# Patient Record
Sex: MALE | Race: WHITE | NOT HISPANIC OR LATINO | URBAN - METROPOLITAN AREA
[De-identification: names, ages, dates, MRNs, and addresses within clinical notes are randomized per-mention and may not be internally consistent; named-entity substitution may affect disease eponyms.]

---

## 2019-03-11 ENCOUNTER — EMERGENCY (EMERGENCY)
Facility: HOSPITAL | Age: 69
LOS: 1 days | Discharge: HOME | End: 2019-03-11
Attending: EMERGENCY MEDICINE | Admitting: EMERGENCY MEDICINE
Payer: MEDICARE

## 2019-03-11 VITALS
SYSTOLIC BLOOD PRESSURE: 168 MMHG | HEART RATE: 112 BPM | OXYGEN SATURATION: 99 % | TEMPERATURE: 99 F | DIASTOLIC BLOOD PRESSURE: 81 MMHG | RESPIRATION RATE: 18 BRPM
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
## 2019-03-11 DIAGNOSIS — S00.83XA CONTUSION OF OTHER PART OF HEAD, INITIAL ENCOUNTER: ICD-10-CM
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
## 2019-03-11 PROCEDURE — 99284 EMERGENCY DEPT VISIT MOD MDM: CPT | Mod: 25

## 2019-03-11 PROCEDURE — 70450 CT HEAD/BRAIN W/O DYE: CPT | Mod: 26

## 2019-03-11 PROCEDURE — 70450 CT HEAD/BRAIN W/O DYE: CPT

## 2019-03-11 NOTE — ED ADULT NURSE NOTE - NSIMPLEMENTINTERV_GEN_ALL_ED
Implemented All Fall Risk Interventions:  [place] to call system. Call bell, personal items and telephone within reach. Instruct patient to call for assistance. Room bathroom lighting operational. Non-slip footwear when patient is off stretcher. Physically safe environment: no spills, clutter or unnecessary equipment. Stretcher in lowest position, wheels locked, appropriate side rails in place. Provide visual cue, wrist band, yellow gown, etc. Monitor gait and stability. Monitor for mental status changes and reorient to person, place, and time. Review medications for side effects contributing to fall risk. Reinforce activity limits and safety measures with patient and family.

## 2019-03-11 NOTE — ED ADULT NURSE NOTE - OBJECTIVE STATEMENT
Received a 68 year old male with a chief complaint of ecchymosis to the eyelid. Patient with a reported fall about four weeks prior. No significant PMHx reported.

## 2019-03-11 NOTE — ED ADULT NURSE NOTE - CHPI ED NUR SYMPTOMS NEG
no fever/no blurred vision/no weakness/no numbness/no vomiting/no nausea/no dizziness/no loss of consciousness/no confusion/no change in level of consciousness

## 2019-03-12 VITALS
OXYGEN SATURATION: 98 % | DIASTOLIC BLOOD PRESSURE: 82 MMHG | SYSTOLIC BLOOD PRESSURE: 160 MMHG | HEART RATE: 82 BPM | RESPIRATION RATE: 16 BRPM

## 2019-03-12 NOTE — ED PROVIDER NOTE - NSFOLLOWUPINSTRUCTIONS_ED_ALL_ED_FT
Eye Contusion  ImageAn eye contusion is a deep bruise of the eye or the area around the eye. This injury is often called a "black eye." Contusions are the result of a blunt injury to tissues and muscle fibers under the skin. This causes bleeding under the skin. The skin over the contusion may turn blue, purple, or yellow. Minor injuries may be painless. More severe contusions may stay painful and swollen for a few weeks. Eye contusions can affect your eyeball and your sight.    What are the causes?  An eye contusion may be caused by:    A hard hit, trauma, or direct force to your face or eye.  A head injury that causes the blood under your skin to flow toward your eyelids.  Facial surgery, such as a facelift or nose surgery.  Dental work, including wisdom tooth extraction or dental implant surgery.    What increases the risk?  You may be at greater risk for this condition if you play contact sports, such as baseball, basketball, or wrestling.    What are the signs or symptoms?  Symptoms of this condition include:    Pain and swelling around your eye.  Discoloration around your eye. The area may start out red and then turn blue, purple, green, or yellow.  Blurry vision.  Tearing.  Eyeball redness.    How is this diagnosed?  This condition is diagnosed from a physical exam and your medical history. Your health care provider will test your eye motion and make sure that your eye is not injured. A light will be shined into your eye to make sure that your pupil widens (dilates) normally. The bones in your face and around your eye will also be checked for injuries. You may also have:    A vision test.  An X-ray or CT scan to check for other injuries, such as broken bones.    How is this treated?  An eye contusion usually heals on its own in a few days or weeks. Your health care provider may recommend icing your eye and taking pain medicine. If you have broken bones or an injury to the eyeball, surgery may be needed.    Follow these instructions at home:  If directed, apply ice to the injured area:    Put ice in a plastic bag.  Place a towel between your skin and the bag.  Leave the ice on for 20 minutes, 2–3 times per day.    Take over-the-counter and prescription medicines only as told by your health care provider.  Sleep with your head raised (elevated). You may do this by putting an extra pillow under your head.  Return to your normal activities as told by your health care provider. Ask your health care provider what activities are safe for you.  ImageKeep all follow-up visits as told by your health care provider. This is important.  Contact a health care provider if:  Your symptoms do not improve after several days.  Your swelling or pain is not relieved with medicines.  Get help right away if:  You have vision loss.  You have double vision.  Your eye suddenly becomes red.  Your pupil is an odd shape or size.  You have severe pain.  You feel nauseous or you vomit.  You feel dizzy or sleepy, or you feel like you will faint.  You faint.  You have a lot of clear fluid coming from your eye or nose.  This information is not intended to replace advice given to you by your health care provider. Make sure you discuss any questions you have with your health care provider.

## 2019-03-12 NOTE — ED PROVIDER NOTE - PHYSICAL EXAMINATION
GEN: Well appearing, well nourished, awake, alert, oriented to person, place, time/situation and in no apparent distress.  ENT: Airway patent, Nasal mucosa clear. Mouth with normal mucosa.  EYES: Clear bilaterally. perrla, eomi, no proptosis, no tearing  RESPIRATORY: Breathing comfortably with normal RR.  CARDIAC: Regular rate and rhythm  ABDOMEN: Soft, nontender, +bowel sounds, no rebound, rigidity, or guarding.  MSK: Range of motion is not limited, no deformities noted.  NEURO: Alert and oriented x 3. Cn 2-12 intact. Strength 5/5 and sensation intact in all 4 extremities. no pronator drift. FTN normal. Neg Rhomberg. Gait normal.   SKIN: Skin normal color for race, warm, dry and intact. ecchymosis (appears at least 1-2 days old) noted over left eyelid  PSYCH: Alert and oriented to person, place, time/situation. normal mood and affect. no apparent risk to self or others.

## 2019-03-12 NOTE — ED PROVIDER NOTE - CARE PROVIDER_API CALL
Iglesia Jacobs)  Internal Medicine  [address] [place] [address]  Phone: [phone]  Fax: [phone]  Follow Up Time:

## 2019-03-12 NOTE — ED PROVIDER NOTE - NSFOLLOWUPCLINICS_GEN_ALL_ED_FT
[facility]  Family Medicine  [facility]. [address]  [place], [place] [facility]  Phone: [phone]  Fax:   Follow Up Time:

## 2019-03-12 NOTE — ED PROVIDER NOTE - CLINICAL SUMMARY MEDICAL DECISION MAKING FREE TEXT BOX
pt with 2 mechanical falls, head trauma in the past 3 weeks and left eyelid bruise, no nadeem TTP to face, VSS no neuro deficits, no visual complaints. CT head neg, stable for DC. The patient is stable for DC and is feeling much better.  Symptoms have improved and after discussion regarding discharge, patient feels comfortable going home.  Answers to all questions provided.  Patient feeling satisfactory with care and follow up plan discussed. They were advised to call their PMD for prompt outpatient follow up. Return precautions were discussed. The patient was advised to return to the ER for any concerning or worsening symptoms.

## 2019-03-12 NOTE — ED PROVIDER NOTE - OBJECTIVE STATEMENT
68M with no sig PMHx who p/w fall 2-3 weeks ago and again 1 week ago, now with bruise over left eyelid, sent in for CT head 68M with no sig PMHx who p/w fall 2-3 weeks ago while doing pull ups at the gym (hit the back of his head, no LOC), and again 1 week ago (trip and fall at home) who presents to the ER today because he noticed a bruise over left eyelid, he remembers rubbing his eye the other night and feeling a brief pinch. he was seen by his ophtho who sent him to the ER for CT head. Pt denies HA, neck pain or vision change, no n/t/w in ext. not on blood thinners.

## 2020-11-03 NOTE — ED ADULT NURSE NOTE - CAS TRG GEN SKIN COLOR
Normal for race 1. More specific and explicit understanding of pt's prognosis --weeks, months. They want to know so they can better plan. 2. What options they have to access HHA support at home to help ot with ADLs, hygiene, etc.

## 2021-10-09 NOTE — ED PROVIDER NOTE - CROS ED RESP ALL NEG
CC: f/u for fever - now resolved     Patient reports nothing     REVIEW OF SYSTEMS:  Limited - poorly interactive     Antimicrobials Day #  : 3  valACYclovir 500 milliGRAM(s) Oral two times a day    Other Medications Reviewed    T(F): 98.5 (10-09-21 @ 05:15), Max: 98.6 (10-08-21 @ 09:35)  HR: 96 (10-09-21 @ 05:15)  BP: 144/78 (10-09-21 @ 05:15)  RR: 20 (10-09-21 @ 05:15)11  SpO2: 93% (10-09-21 @ 05:15)  Wt(kg): --    PHYSICAL EXAM:  General: alert, no acute distress  Eyes:  anicteric, no conjunctival injection, no discharge  Neck: supple  Lungs: clear to auscultation  Heart: regular rate and rhythm; no murmurs  Abdomen: soft, nondistended, nontenders   Skin: no lesions  Extremities: no clubbing, cyanosis, or edema  Neurologic: alert, oriented, moves all extremities    LAB RESULTS:                        11.7   6.89  )-----------( 170      ( 09 Oct 2021 07:18 )             35.9     10-09    132<L>  |  96  |  20  ----------------------------<  126<H>  4.2   |  20<L>  |  1.55<H>    Ca    10.8<H>      09 Oct 2021 07:16  Mg     2.2     10-09    TPro  6.0  /  Alb  3.0<L>  /  TBili  0.4  /  DBili  x   /  AST  90<H>  /  ALT  36  /  AlkPhos  144<H>  10-09    LIVER FUNCTIONS - ( 09 Oct 2021 07:16 )  Alb: 3.0 g/dL / Pro: 6.0 g/dL / ALK PHOS: 144 U/L / ALT: 36 U/L / AST: 90 U/L / GGT: x             MICROBIOLOGY:  RECENT CULTURES:  10-07 @ 16:57 Ear R buttock wound culture     Moderate Gram positive organisms      10-05 @ 21:26 Clean Catch Clean Catch (Midstream)     <10,000 CFU/mL Normal Urogenital Kaylyn      10-05 @ 14:55 .Blood Blood-Peripheral     No growth to date.      10-04 @ 13:33 Clean Catch Clean Catch (Midstream) Klebsiella pneumoniae    50,000 - 99,000 CFU/mL Klebsiella pneumoniae      10-04 @ 11:16 .Blood Blood-Peripheral     No growth to date.      RADIOLOGY REVIEWED:     negative...